# Patient Record
Sex: MALE | Race: WHITE | Employment: OTHER | URBAN - METROPOLITAN AREA
[De-identification: names, ages, dates, MRNs, and addresses within clinical notes are randomized per-mention and may not be internally consistent; named-entity substitution may affect disease eponyms.]

---

## 2022-06-13 ENCOUNTER — PATIENT MESSAGE (OUTPATIENT)
Dept: ADMINISTRATIVE | Facility: OTHER | Age: 67
End: 2022-06-13
Payer: MEDICARE

## 2022-06-13 ENCOUNTER — OFFICE VISIT (OUTPATIENT)
Dept: URGENT CARE | Facility: CLINIC | Age: 67
End: 2022-06-13
Payer: MEDICARE

## 2022-06-13 VITALS
DIASTOLIC BLOOD PRESSURE: 79 MMHG | WEIGHT: 165 LBS | BODY MASS INDEX: 23.1 KG/M2 | HEART RATE: 71 BPM | SYSTOLIC BLOOD PRESSURE: 139 MMHG | OXYGEN SATURATION: 98 % | RESPIRATION RATE: 18 BRPM | HEIGHT: 71 IN | TEMPERATURE: 97 F

## 2022-06-13 DIAGNOSIS — S99.922A FOOT INJURY, LEFT, INITIAL ENCOUNTER: ICD-10-CM

## 2022-06-13 DIAGNOSIS — S92.912A FRACTURE OF PROXIMAL PHALANX OF TOE OF LEFT FOOT: Primary | ICD-10-CM

## 2022-06-13 PROCEDURE — 73630 XR FOOT COMPLETE 3 VIEW LEFT: ICD-10-PCS | Mod: LT,S$GLB,, | Performed by: RADIOLOGY

## 2022-06-13 PROCEDURE — 99204 PR OFFICE/OUTPT VISIT, NEW, LEVL IV, 45-59 MIN: ICD-10-PCS | Mod: S$GLB,,, | Performed by: NURSE PRACTITIONER

## 2022-06-13 PROCEDURE — 99204 OFFICE O/P NEW MOD 45 MIN: CPT | Mod: S$GLB,,, | Performed by: NURSE PRACTITIONER

## 2022-06-13 PROCEDURE — 73630 X-RAY EXAM OF FOOT: CPT | Mod: LT,S$GLB,, | Performed by: RADIOLOGY

## 2022-06-13 RX ORDER — DARUNAVIR ETHANOLATE AND COBICISTAT 800; 150 MG/1; MG/1
1 TABLET, FILM COATED ORAL DAILY
COMMUNITY
Start: 2022-05-28

## 2022-06-13 RX ORDER — DORAVIRINE 100 MG/1
1 TABLET, FILM COATED ORAL DAILY
COMMUNITY
Start: 2022-05-28

## 2022-06-13 NOTE — PATIENT INSTRUCTIONS
Return to Urgent Care or go to ER if symptoms worsen or fail to improve.  Follow up with Orthopedics at home within 2 weeks as recommended for further management.   Walking boot when weight bearing as needed for pain.   Elevate left foot above level of heart as much as possible.  May take ibuprofen or tylenol according to label instructions as needed for pain.

## 2022-06-13 NOTE — PROGRESS NOTES
"Subjective:       Patient ID: Mariusz Burden is a 67 y.o. male.    Vitals:  height is 5' 11" (1.803 m) and weight is 74.8 kg (165 lb). His temperature is 96.6 °F (35.9 °C). His blood pressure is 139/79 and his pulse is 71. His respiration is 18 and oxygen saturation is 98%.     Chief Complaint: Foot Injury (left)    Foot Injury   The incident occurred 5 to 7 days ago. The incident occurred at home. The injury mechanism was a fall. The pain is present in the left foot. The quality of the pain is described as aching. The pain is at a severity of 4/10. The pain is mild. The pain has been fluctuating since onset. Pertinent negatives include no numbness or tingling. He reports no foreign bodies present. The symptoms are aggravated by weight bearing. He has tried ice for the symptoms. The treatment provided no relief.       Constitution: Negative for chills, sweating, fatigue and fever.   Musculoskeletal: Positive for pain, trauma, joint pain, abnormal ROM of joint and pain with walking.   Neurological: Negative for numbness.       Objective:      Physical Exam   Constitutional:  Non-toxic appearance. He does not appear ill. No distress.   Musculoskeletal:      Left foot: Decreased range of motion. Normal capillary refill. Tenderness and swelling present.        Feet:    Neurological: He is alert.   Skin: Skin is not diaphoretic.   Nursing note and vitals reviewed.        XR FOOT COMPLETE 3 VIEW LEFT    Result Date: 6/13/2022  EXAMINATION: XR FOOT COMPLETE 3 VIEW LEFT CLINICAL HISTORY: .  Unspecified injury of left foot, initial encounter TECHNIQUE: AP, lateral and oblique views of the left foot were performed. COMPARISON: None FINDINGS: The patient may have a nondisplaced fracture involving the 5th proximal phalanx. .  Significant soft tissue swelling noted adjacent to the 5th metatarsophalangeal joint.  No other definite fractures or dislocation.  No bone destruction identified     See above Electronically signed " by: Renard Monae MD Date:    06/13/2022 Time:    14:28    Assessment:       1. Fracture of proximal phalanx of toe of left foot    2. Foot injury, left, initial encounter          Plan:         Fracture of proximal phalanx of toe of left foot  -     Ambulatory referral/consult to Orthopedics    Foot injury, left, initial encounter  -     XR FOOT COMPLETE 3 VIEW LEFT; Future; Expected date: 06/13/2022  -     Ambulatory referral/consult to Orthopedics

## 2022-06-15 ENCOUNTER — TELEPHONE (OUTPATIENT)
Dept: URGENT CARE | Facility: CLINIC | Age: 67
End: 2022-06-15
Payer: MEDICARE

## 2022-06-15 NOTE — TELEPHONE ENCOUNTER
Called patient to follow up regarding visit on 06/13/22. Patient states he is compressing and elevating the injury and taking ibuprofen for pain. States he is currently looking into seeing a podiatrist vs ortho for follow up regarding fracture. Denies any other questions at this time.

## 2025-03-11 ENCOUNTER — TELEPHONE (OUTPATIENT)
Dept: DERMATOLOGY | Facility: CLINIC | Age: 70
End: 2025-03-11
Payer: MEDICARE

## 2025-03-11 NOTE — TELEPHONE ENCOUNTER
----- Message from Dontrell Araiza sent at 3/10/2025  1:37 PM CDT -----  Caller is requesting  appointment. Name of Caller:Mariusz When is the first available appointment?none Symptoms:skin lesion Best Call Back Number:[phone]

## 2025-05-06 ENCOUNTER — RESULTS FOLLOW-UP (OUTPATIENT)
Dept: URGENT CARE | Facility: CLINIC | Age: 70
End: 2025-05-06

## 2025-05-06 ENCOUNTER — OFFICE VISIT (OUTPATIENT)
Dept: URGENT CARE | Facility: CLINIC | Age: 70
End: 2025-05-06
Payer: MEDICARE

## 2025-05-06 ENCOUNTER — HOSPITAL ENCOUNTER (OUTPATIENT)
Dept: RADIOLOGY | Facility: OTHER | Age: 70
Discharge: HOME | End: 2025-05-06
Attending: INTERNAL MEDICINE
Payer: MEDICARE

## 2025-05-06 VITALS
OXYGEN SATURATION: 98 % | TEMPERATURE: 98 F | HEIGHT: 71 IN | BODY MASS INDEX: 22.76 KG/M2 | DIASTOLIC BLOOD PRESSURE: 85 MMHG | HEART RATE: 75 BPM | SYSTOLIC BLOOD PRESSURE: 149 MMHG | WEIGHT: 162.56 LBS | RESPIRATION RATE: 20 BRPM

## 2025-05-06 DIAGNOSIS — M25.551 PAIN OF RIGHT HIP: ICD-10-CM

## 2025-05-06 DIAGNOSIS — M25.559 HIP PAIN, UNSPECIFIED LATERALITY: Primary | ICD-10-CM

## 2025-05-06 PROCEDURE — 73700 CT LOWER EXTREMITY W/O DYE: CPT | Mod: TC,RT

## 2025-05-06 PROCEDURE — 73502 X-RAY EXAM HIP UNI 2-3 VIEWS: CPT | Mod: RT,S$GLB,, | Performed by: RADIOLOGY

## 2025-05-06 PROCEDURE — 73700 CT LOWER EXTREMITY W/O DYE: CPT | Mod: 26,RT,, | Performed by: RADIOLOGY

## 2025-05-06 PROCEDURE — 99214 OFFICE O/P EST MOD 30 MIN: CPT | Mod: S$GLB,,, | Performed by: INTERNAL MEDICINE

## 2025-05-06 RX ORDER — ATORVASTATIN CALCIUM 20 MG/1
20 TABLET, FILM COATED ORAL
COMMUNITY

## 2025-05-06 RX ORDER — LOSARTAN POTASSIUM 50 MG/1
50 TABLET ORAL
COMMUNITY

## 2025-05-06 RX ORDER — LORAZEPAM 0.5 MG/1
TABLET ORAL
COMMUNITY

## 2025-05-06 RX ORDER — AMLODIPINE BESYLATE 5 MG/1
5 TABLET ORAL
COMMUNITY

## 2025-05-06 RX ORDER — ACETAMINOPHEN 500 MG
1 TABLET ORAL
COMMUNITY
Start: 2025-02-24

## 2025-05-06 NOTE — PATIENT INSTRUCTIONS
Please go to the [facility] center for your CT scan, the address is on this after visit summary.     I will call with results and your plan of care.
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
